# Patient Record
Sex: FEMALE | Race: WHITE | URBAN - METROPOLITAN AREA
[De-identification: names, ages, dates, MRNs, and addresses within clinical notes are randomized per-mention and may not be internally consistent; named-entity substitution may affect disease eponyms.]

---

## 2020-06-18 ENCOUNTER — OFFICE VISIT (OUTPATIENT)
Dept: ENT CLINIC | Age: 55
End: 2020-06-18
Payer: COMMERCIAL

## 2020-06-18 VITALS
WEIGHT: 213 LBS | HEART RATE: 74 BPM | BODY MASS INDEX: 35.49 KG/M2 | HEIGHT: 65 IN | DIASTOLIC BLOOD PRESSURE: 73 MMHG | SYSTOLIC BLOOD PRESSURE: 110 MMHG | TEMPERATURE: 98.1 F

## 2020-06-18 PROCEDURE — 31231 NASAL ENDOSCOPY DX: CPT | Performed by: OTOLARYNGOLOGY

## 2020-06-18 PROCEDURE — 99244 OFF/OP CNSLTJ NEW/EST MOD 40: CPT | Performed by: OTOLARYNGOLOGY

## 2020-06-18 ASSESSMENT — ENCOUNTER SYMPTOMS
CHEST TIGHTNESS: 0
CONSTIPATION: 0
COUGH: 0
BACK PAIN: 0
SHORTNESS OF BREATH: 0
WHEEZING: 0
EYE PAIN: 0
APNEA: 0
COLOR CHANGE: 0
FACIAL SWELLING: 0
NAUSEA: 0
TROUBLE SWALLOWING: 0
BLOOD IN STOOL: 0
EYE DISCHARGE: 0
VOICE CHANGE: 0
VOMITING: 0
DIARRHEA: 0
SINUS PAIN: 0
SINUS PRESSURE: 0
STRIDOR: 0
CHOKING: 0
SORE THROAT: 0
RHINORRHEA: 0

## 2020-06-23 ENCOUNTER — TELEPHONE (OUTPATIENT)
Dept: ENT CLINIC | Age: 55
End: 2020-06-23

## 2020-06-23 NOTE — TELEPHONE ENCOUNTER
Patient calls office to inform that PCP is going to treat her as if she has an infection-urine showed blood. Dr Monroe Sorenson made aware. Dr Monroe Sorenson to call Dr Isidro Frey -Dr Micha Enriquez assistant-states they reviewed the urine report and only shows blood no leukocytes-will proceed with surgery. Patient called and made aware.

## 2020-07-02 ENCOUNTER — OFFICE VISIT (OUTPATIENT)
Dept: ENT CLINIC | Age: 55
End: 2020-07-02

## 2020-07-02 VITALS — TEMPERATURE: 97.2 F | WEIGHT: 206 LBS | BODY MASS INDEX: 34.32 KG/M2 | HEIGHT: 65 IN

## 2020-07-02 PROCEDURE — 99024 POSTOP FOLLOW-UP VISIT: CPT | Performed by: OTOLARYNGOLOGY

## 2020-07-02 NOTE — PROGRESS NOTES
S/P endoscopic expanded transsphenoidal. No rhinorrhea or bleeding. Splints were removed and nasal cavity not  suctioned. Healing well. Saline rinses twice a day. Saline sprays 5-6 times a day. F/U in 3 weeks.

## 2020-07-15 ENCOUNTER — TELEPHONE (OUTPATIENT)
Dept: ENT CLINIC | Age: 55
End: 2020-07-15

## 2020-07-15 NOTE — TELEPHONE ENCOUNTER
Patient called states had surgery (co-case with Dr Annabelle Franz) 6/24/20 Has been ok until yesterday. Now with clear rhinorrhea. Concerned that it may be CSF fluid. States yesterday it started and this morning looking down nasal drainage came quickly, and a good amount. Does not know if she has fever, but states has had chills last couple days. Now congested in right nare. Patient aware Dr Maxwell Collins in surgery, will forward message to him, and I will call her back today. Instructed patient to call Dr Annabelle Franz office as well, with update.

## 2020-07-16 ENCOUNTER — OFFICE VISIT (OUTPATIENT)
Dept: ENT CLINIC | Age: 55
End: 2020-07-16

## 2020-07-16 VITALS — SYSTOLIC BLOOD PRESSURE: 122 MMHG | HEART RATE: 92 BPM | DIASTOLIC BLOOD PRESSURE: 73 MMHG | TEMPERATURE: 97.9 F

## 2020-07-16 PROCEDURE — 99024 POSTOP FOLLOW-UP VISIT: CPT | Performed by: OTOLARYNGOLOGY

## 2020-07-16 RX ORDER — ACETAMINOPHEN 325 MG/1
650 TABLET ORAL EVERY 6 HOURS PRN
COMMUNITY

## 2020-07-16 RX ORDER — AMOXICILLIN 500 MG/1
500 CAPSULE ORAL 2 TIMES DAILY
Qty: 14 CAPSULE | Refills: 0 | Status: SHIPPED | OUTPATIENT
Start: 2020-07-16 | End: 2020-07-23

## 2020-07-16 NOTE — PROGRESS NOTES
S/P suprsellar mass resection 3 weeks ago now with one day history of clear rhinorrhea. Denies precipatating event. No headaches or neck pain. No fevers. PE: Endo. Nasal endoscopy was performed showing clear rhinorrheea bilateral, minimal    A/P: Plan admission tomorrow at [facility] for surgery Saturday. 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 With Gozal.   Amoxil prohylaxis.

## 2020-07-18 ENCOUNTER — OUTSIDE SERVICES (OUTPATIENT)
Dept: OTOLARYNGOLOGY | Age: 55
End: 2020-07-18

## 2020-07-18 NOTE — PROGRESS NOTES
Operative Report - Gabrielle Nunes MD - 07/18/2020 9:35 AM EDT      Pre-operative Dx: Suprasellar CSF leak  Post-operative Dx: Same  Procedure: Nasal endoscopy with repair of post-operative suprasellar CSF leak  Surgeon: Dr Emeterio East  Anesthesia: General Anesthesia  EBL: 20 ml  Specimens: None  Antibiotics: 2g Ancef  Findings: Small leak in the left superior component of the prior reconstruction. Procedure:   Patient came to the operating room and was placed in supine position on the operating room table. General anesthesia was given and an endotracheal tube was placed without difficulty. At that point Dr. Kina Ramon placed a lumbar drain. Once complete the patient was prepped and draped in routine fashion. A zero degree endoscope was used to visualize the sphenoid sinus. Tissue sealant was suctioned free and the septal flap was visualized. CSF rhinorrhea was visualized coming from the inferior left side of the flap. The flap was taken down with a caudal elevator. A small leak was identified from the upper left side of the prior repair. At that point Dr. Kina Ramon repaired the leak And will be dictated in his operative report. I provided neuro-endoscopy for this portion of the procedure. Once completed I replaced the septal flap after re-freshening the mucosal edges to ensure 360 degree bone surface contact. Nasopore was placed to secure the flap in place and Adherus tissue sealant was applied. At that point the procedure was completed. The patient was woken from anesthesia, extubated and sent to the PACU in stable condition.      I attest that I was present for and performed the ENT portion of the case myself and provided assistance throughout the entire neurosurgical portion of the case myself.     Electronically signed by Gabrielle Nunes MD at 07/18/2020 10:03 AM EDT

## 2024-06-13 ENCOUNTER — OFFICE VISIT (OUTPATIENT)
Age: 59
End: 2024-06-13

## 2024-06-13 VITALS
TEMPERATURE: 97.4 F | DIASTOLIC BLOOD PRESSURE: 76 MMHG | WEIGHT: 209 LBS | HEART RATE: 77 BPM | OXYGEN SATURATION: 95 % | HEIGHT: 64 IN | BODY MASS INDEX: 35.68 KG/M2 | SYSTOLIC BLOOD PRESSURE: 121 MMHG

## 2024-06-13 DIAGNOSIS — B37.9 ANTIBIOTIC-INDUCED YEAST INFECTION: ICD-10-CM

## 2024-06-13 DIAGNOSIS — N30.01 ACUTE CYSTITIS WITH HEMATURIA: Primary | ICD-10-CM

## 2024-06-13 DIAGNOSIS — T36.95XA ANTIBIOTIC-INDUCED YEAST INFECTION: ICD-10-CM

## 2024-06-13 LAB
BILIRUBIN, POC: NEGATIVE
BLOOD URINE, POC: ABNORMAL
CLARITY, POC: ABNORMAL
COLOR, POC: YELLOW
GLUCOSE URINE, POC: NEGATIVE
KETONES, POC: NEGATIVE
LEUKOCYTE EST, POC: ABNORMAL
NITRITE, POC: POSITIVE
PH, POC: 5.5
PROTEIN, POC: ABNORMAL
SPECIFIC GRAVITY, POC: >=1.03
UROBILINOGEN, POC: ABNORMAL

## 2024-06-13 RX ORDER — CEPHALEXIN 500 MG/1
500 CAPSULE ORAL 4 TIMES DAILY
Qty: 20 CAPSULE | Refills: 0 | Status: SHIPPED | OUTPATIENT
Start: 2024-06-13 | End: 2024-06-18

## 2024-06-13 RX ORDER — FLUCONAZOLE 150 MG/1
150 TABLET ORAL ONCE
Qty: 1 TABLET | Refills: 0 | Status: SHIPPED | OUTPATIENT
Start: 2024-06-13 | End: 2024-06-13

## 2024-06-13 RX ORDER — PHENAZOPYRIDINE HYDROCHLORIDE 200 MG/1
200 TABLET, FILM COATED ORAL 3 TIMES DAILY PRN
Qty: 6 TABLET | Refills: 0 | Status: SHIPPED | OUTPATIENT
Start: 2024-06-13 | End: 2024-06-15

## 2024-06-13 NOTE — PROGRESS NOTES
Bernarda Marin (:  1965) is a 58 y.o. female,New patient, here for evaluation of the following chief complaint(s):  Urinary Tract Infection (Urinary frequency, bladder pressure, pain during urination x 2 days.)      ASSESSMENT/PLAN:  Visit Diagnoses and Associated Orders       Acute cystitis with hematuria    -  Primary    POCT Urinalysis no Micro [13319 Custom]      Culture, Urine [02901 Custom]      cephALEXin (KEFLEX) 500 MG capsule [9500]      fluconazole (DIFLUCAN) 150 MG tablet [80060]      phenazopyridine (PYRIDIUM) 200 MG tablet [6194]           Antibiotic-induced yeast infection        fluconazole (DIFLUCAN) 150 MG tablet [62055]                  Symptoms and urinalysis indicate UTI.  Begin treatment with antibiotic.  Urine culture ordered for further evaluation.  Adding Pyridium to use as needed for bladder pain and dysuria up to 3x daily.  Diflucan for first sign of yeast infection due to reported history of antibiotic induced yeast.  Continue to stay well hydrated.  Return if symptoms persist or change.  F/u with PCP after treatment for re-evaluation.      SUBJECTIVE/OBJECTIVE:    Bernarda here today c/o urinary frequency, dysuria and suprapubic pain x1 days.  Denies flank pain.  Denies NVD.  Denies fever or chills.  Denies vaginal discharge or irritation.        History provided by:  Patient   used: No        ROS: See HPI       Vitals:    24 0807   BP: 121/76   Pulse: 77   Temp: 97.4 °F (36.3 °C)   TempSrc: Oral   SpO2: 95%   Weight: 94.8 kg (209 lb)   Height: 1.626 m (5' 4\")       Results for POC orders placed in visit on 24   POCT Urinalysis no Micro   Result Value Ref Range    Color, UA YELLOW     Clarity, UA CLOUDY     Glucose, UA POC NEGATIVE     Bilirubin, UA NEGATIVE     Ketones, UA NEGATIVE     Spec Grav, UA >=1.030     Blood, UA POC 3+     pH, UA 5.5     Protein, UA POC 2+     Urobilinogen, UA 0.2 EU/DL     Leukocytes, UA 2+     Nitrite, UA POSITIVE

## 2024-06-13 NOTE — PATIENT INSTRUCTIONS
Symptoms and urinalysis indicate UTI.  Begin treatment with antibiotic.  Urine culture ordered for further evaluation.  Adding Pyridium to use as needed for bladder pain and dysuria up to 3x daily.  Diflucan for first sign of yeast infection due to reported history of antibiotic induced yeast.  Continue to stay well hydrated.  Return if symptoms persist or change.  F/u with PCP after treatment for re-evaluation.

## 2024-06-16 LAB
BACTERIA UR CULT: ABNORMAL
ORGANISM: ABNORMAL